# Patient Record
Sex: FEMALE | Race: BLACK OR AFRICAN AMERICAN
[De-identification: names, ages, dates, MRNs, and addresses within clinical notes are randomized per-mention and may not be internally consistent; named-entity substitution may affect disease eponyms.]

---

## 2021-05-11 ENCOUNTER — APPOINTMENT (OUTPATIENT)
Dept: OBGYN | Facility: CLINIC | Age: 61
End: 2021-05-11

## 2022-07-29 ENCOUNTER — APPOINTMENT (OUTPATIENT)
Dept: ORTHOPEDIC SURGERY | Facility: CLINIC | Age: 62
End: 2022-07-29

## 2022-07-29 VITALS — HEIGHT: 64 IN | WEIGHT: 210 LBS | BODY MASS INDEX: 35.85 KG/M2

## 2022-07-29 DIAGNOSIS — M65.842 OTHER SYNOVITIS AND TENOSYNOVITIS, LEFT HAND: ICD-10-CM

## 2022-07-29 PROBLEM — Z00.00 ENCOUNTER FOR PREVENTIVE HEALTH EXAMINATION: Status: ACTIVE | Noted: 2022-07-29

## 2022-07-29 PROCEDURE — 99203 OFFICE O/P NEW LOW 30 MIN: CPT | Mod: 25

## 2022-07-29 PROCEDURE — 20600 DRAIN/INJ JOINT/BURSA W/O US: CPT

## 2022-07-29 NOTE — ASSESSMENT
[FreeTextEntry1] :  patient has extensor tenosynovitis of the left wrist.  She opted for an aspiration today.  I recommend she use a cock-up wrist splint to rest her wrist.  She has no pain.  If the swelling returns and comes back in going to recommend an MRI for evaluation of the extensor tendons and may require an extensor tenosynovectomy.  I will went through risks benefits alternatives of the aspiration with the patient as well as with her daughter.  Under sterile conditions   The area was cleaned with Betadine and alcohol, sprayed with ethyl chloride  and an aspiration of 1 cc of jelly like fluid was aspirated from the wrist.  A Band-Aid was placed.

## 2022-07-29 NOTE — HISTORY OF PRESENT ILLNESS
[de-identified] : Patient comes in with a mass on the dorsal aspect of the left hand.  Comes in with her daughter.  She says it has been there for quite some time.  Had pain prior but does not have pain anymore.  It has been going on for at least a year.

## 2022-07-29 NOTE — IMAGING
[de-identified] :   Left wrist:  swelling on dorsal aspect of wrist, motion of masses with range of motion of fingers, nontender palpation, neurovascularly intact

## 2022-08-30 ENCOUNTER — APPOINTMENT (OUTPATIENT)
Dept: ORTHOPEDIC SURGERY | Facility: CLINIC | Age: 62
End: 2022-08-30